# Patient Record
Sex: MALE | Race: WHITE | Employment: FULL TIME | URBAN - NONMETROPOLITAN AREA
[De-identification: names, ages, dates, MRNs, and addresses within clinical notes are randomized per-mention and may not be internally consistent; named-entity substitution may affect disease eponyms.]

---

## 2022-12-02 ENCOUNTER — HOSPITAL ENCOUNTER (EMERGENCY)
Age: 57
Discharge: HOME | End: 2022-12-02
Attending: FAMILY MEDICINE
Payer: COMMERCIAL

## 2022-12-02 VITALS
WEIGHT: 222.7 LBS | TEMPERATURE: 98.7 F | RESPIRATION RATE: 20 BRPM | DIASTOLIC BLOOD PRESSURE: 70 MMHG | HEIGHT: 78 IN | BODY MASS INDEX: 25.77 KG/M2 | SYSTOLIC BLOOD PRESSURE: 122 MMHG | HEART RATE: 82 BPM | OXYGEN SATURATION: 97 %

## 2022-12-02 DIAGNOSIS — N30.01 ACUTE CYSTITIS WITH HEMATURIA: Primary | ICD-10-CM

## 2022-12-02 LAB
-: ABNORMAL
BACTERIA: ABNORMAL
BILIRUBIN URINE: NEGATIVE
COLOR: YELLOW
COMMENT UA: ABNORMAL
EPITHELIAL CELLS UA: ABNORMAL /HPF
GLUCOSE URINE: NEGATIVE
KETONES, URINE: NEGATIVE
LEUKOCYTE ESTERASE, URINE: ABNORMAL
NITRITE, URINE: NEGATIVE
PH UA: 6 (ref 5–8)
PROTEIN UA: ABNORMAL
RBC UA: ABNORMAL /HPF (ref 0–2)
SPECIFIC GRAVITY UA: 1.01 (ref 1–1.03)
TURBIDITY: CLEAR
URINE HGB: ABNORMAL
UROBILINOGEN, URINE: NORMAL
WBC UA: ABNORMAL /HPF

## 2022-12-02 PROCEDURE — 6370000000 HC RX 637 (ALT 250 FOR IP): Performed by: FAMILY MEDICINE

## 2022-12-02 PROCEDURE — 87186 SC STD MICRODIL/AGAR DIL: CPT

## 2022-12-02 PROCEDURE — 87086 URINE CULTURE/COLONY COUNT: CPT

## 2022-12-02 PROCEDURE — 81001 URINALYSIS AUTO W/SCOPE: CPT

## 2022-12-02 PROCEDURE — 87088 URINE BACTERIA CULTURE: CPT

## 2022-12-02 PROCEDURE — 99283 EMERGENCY DEPT VISIT LOW MDM: CPT

## 2022-12-02 RX ORDER — SULFAMETHOXAZOLE AND TRIMETHOPRIM 800; 160 MG/1; MG/1
1 TABLET ORAL 2 TIMES DAILY
Qty: 20 TABLET | Refills: 0 | Status: SHIPPED | OUTPATIENT
Start: 2022-12-02 | End: 2022-12-12

## 2022-12-02 RX ORDER — SULFAMETHOXAZOLE AND TRIMETHOPRIM 800; 160 MG/1; MG/1
1 TABLET ORAL ONCE
Status: COMPLETED | OUTPATIENT
Start: 2022-12-02 | End: 2022-12-02

## 2022-12-02 RX ADMIN — SULFAMETHOXAZOLE AND TRIMETHOPRIM 1 TABLET: 800; 160 TABLET ORAL at 22:56

## 2022-12-02 ASSESSMENT — PAIN DESCRIPTION - PAIN TYPE: TYPE: ACUTE PAIN

## 2022-12-02 ASSESSMENT — PAIN DESCRIPTION - DESCRIPTORS: DESCRIPTORS: BURNING

## 2022-12-02 ASSESSMENT — PAIN - FUNCTIONAL ASSESSMENT: PAIN_FUNCTIONAL_ASSESSMENT: 0-10

## 2022-12-02 ASSESSMENT — PAIN DESCRIPTION - LOCATION: LOCATION: PENIS

## 2022-12-02 ASSESSMENT — PAIN SCALES - GENERAL: PAINLEVEL_OUTOF10: 5

## 2022-12-03 NOTE — ED PROVIDER NOTES
[facility]  eMERGENCY dEPARTMENT eNCOUnter          [facility]       Chief Complaint   Patient presents with    Dysuria     C/o about dysuria for the past 3 days, states \"it hurts when I go pee. \"       Nurses Notes reviewed and I agree except as noted in the HPI. HISTORY OF PRESENT ILLNESS    Lv Cintron is a 61 y.o. male who presents to the emergency room via private vehicle, patient complaining of 3 days of dysuria, describing 5 out of 10 pain with urination, burning, denies any gross pus denies any trauma. Patient states not been sexually active for the past year, denies possibility of STD. Patient states 3 to 5 years ago he did have a problem that he had seen urologist, was put on antibiotics, the hemorrhoid was called. REVIEW OF SYSTEMS     Review of Systems   Genitourinary:  Positive for dysuria. All other systems reviewed and are negative. PAST MEDICAL HISTORY    has a past medical history of Depression, Hyperlipidemia, and Pneumonia. SURGICAL HISTORY      has a past surgical history that includes knee surgery (Left, 2005); Vasectomy (2004); knee surgery (Left); knee surgery (Left); Colonoscopy; Dental surgery; Appendectomy; and other surgical history (Left, 03/03/2015). CURRENT MEDICATIONS       Discharge Medication List as of 12/2/2022 10:53 PM        CONTINUE these medications which have NOT CHANGED    Details   desvenlafaxine (PRISTIQ) 50 MG TB24 Take 50 mg by mouth daily. cyclobenzaprine (FLEXERIL) 10 MG tablet Take 10 mg by mouth 3 times daily as needed. naproxen sodium (ANAPROX) 550 MG tablet Take 1 tab by mouth every 12 hours when necessary pain, Disp-14 tablet, R-0             ALLERGIES     is allergic to celery oil. FAMILY HISTORY     has no family status information on file. family history is not on file. SOCIAL HISTORY      reports that he has been smoking cigarettes. He has a 18.00 pack-year smoking history.  He has never used smokeless tobacco. He reports current alcohol use of about 3.0 standard drinks per week. He reports that he does not use drugs. PHYSICAL EXAM     INITIAL VITALS:  height is 6' 6.5\" (1.994 m) and weight is 222 lb 11.2 oz (101 kg). His oral temperature is 98.7 °F (37.1 °C). His blood pressure is 122/70 and his pulse is 82. His respiration is 20 and oxygen saturation is 97%. Physical Exam   Constitutional: Patient is oriented to person, place, and time. Patient appears well-developed and well-nourished. Patient is active and cooperative. Neck: Full passive range of motion without pain and phonation normal.   Cardiovascular:  Normal rate, regular rhythm and intact distal pulses. Pulses: Right radial pulse  2+   Pulmonary/Chest: Effort normal. No tachypnea and no bradypnea. Abdominal: Soft. Patient without distension or tenderness, no marked flank tenderness  : deferred  Musculoskeletal:   Negative acute trauma or deformity,  apparent full range of motion and normal strength all extremities appropriate to age. Neurological: Patient is alert and oriented to person, place, and time. patient displays no tremor. Patient displays no seizure activity. Skin: Skin is warm and dry. Patient is not diaphoretic. Psychiatric: Patient has a normal mood and affect.  Patient speech is normal and behavior is normal. Cognition and memory are normal.     DIFFERENTIAL DIAGNOSIS:   UTI, prostatitis    DIAGNOSTIC RESULTS           RADIOLOGY: non-plain film images(s) such as CT, Ultrasound and MRI are read by the radiologist.  No orders to display       LABS:   Labs Reviewed   URINALYSIS - Abnormal; Notable for the following components:       Result Value    Urine Hgb 2+ (*)     Protein, UA 1+ (*)     Leukocyte Esterase, Urine 3+ (*)     All other components within normal limits   MICROSCOPIC URINALYSIS - Abnormal; Notable for the following components:    Bacteria, UA RARE (*)     All other components within normal limits   CULTURE, URINE       EMERGENCY DEPARTMENT COURSE:   Vitals:    Vitals:    12/02/22 2156 12/02/22 2158 12/02/22 2202   BP:  135/81 122/70   Pulse:  82    Resp:  20    Temp:  98.7 °F (37.1 °C)    TempSrc:  Oral    SpO2:  97%    Weight: 222 lb 11.2 oz (101 kg)     Height: 6' 6.5\" (1.994 m)       Patient history and physical exam taken at bedside, discussed patient symptoms and exam findings, discussed getting urine sample will reevaluate, patient acknowledges    Urinalysis reviewed    Discussed with patient urine findings, consistent with likely UTI, further discussion with pitting him seeing a urologist for years prior, is possible patient has a history of prostatitis, given his history, as well as noting patient's height of 6 foot 6 elongated fingers, he is never tested for and does not know what Marfan syndrome is, we will start him on Bactrim DS instead of a fluoroquinolone, first dose in the emergency room prescription same, let patient follow-up with his PCP, can also follow-up with his previous urology group although unable to find his urologist by name I did list another urologist in the same group for contact, advised patient stay well-hydrated with water throughout the day, acknowledges    FINAL IMPRESSION      1.  Acute cystitis with hematuria          DISPOSITION/PLAN   D/c    PATIENT REFERRED TO:  WIN Ramírez  [facility]  [place] [id number]    Call       Juanita King [phone] [address]  [phone]      Sharla Hernandez MEDICATIONS:  Discharge Medication List as of 12/2/2022 10:53 PM        START taking these medications    Details   sulfamethoxazole-trimethoprim (BACTRIM DS) 800-160 MG per tablet Take 1 tablet by mouth 2 times daily for 10 days, Disp-20 tablet, R-0Normal                 Summation      Patient Course:  d/c    ED Medications administered this visit:    Medications   sulfamethoxazole-trimethoprim (BACTRIM DS;SEPTRA DS) 800-160 MG per tablet 1 tablet (1 tablet Oral Given 12/2/22 4371)       New Prescriptions from this visit:    Discharge Medication List as of 12/2/2022 10:53 PM        START taking these medications    Details   sulfamethoxazole-trimethoprim (BACTRIM DS) 800-160 MG per tablet Take 1 tablet by mouth 2 times daily for 10 days, Disp-20 tablet, R-0Normal             Follow-up:  WIN Perez  [facility] [id number]    Call       Juanita Almeida [phone] [address]  [phone]      Urololgy        Final Impression:   1.  Acute cystitis with hematuria               (Please note that portions of this note were completed with a voice recognition program.  Efforts were made to edit the dictations but occasionally words are mis-transcribed.)    MD Austin Wolfe MD  12/02/22 9225

## 2022-12-04 LAB
CULTURE: ABNORMAL
SPECIMEN DESCRIPTION: ABNORMAL

## 2023-02-01 ENCOUNTER — HOSPITAL ENCOUNTER (EMERGENCY)
Age: 58
Discharge: HOME | End: 2023-02-02
Attending: FAMILY MEDICINE
Payer: COMMERCIAL

## 2023-02-01 VITALS
HEIGHT: 78 IN | DIASTOLIC BLOOD PRESSURE: 60 MMHG | WEIGHT: 220.8 LBS | TEMPERATURE: 98.7 F | OXYGEN SATURATION: 98 % | RESPIRATION RATE: 16 BRPM | HEART RATE: 66 BPM | SYSTOLIC BLOOD PRESSURE: 103 MMHG | BODY MASS INDEX: 25.55 KG/M2

## 2023-02-01 DIAGNOSIS — S61.411A LACERATION OF RIGHT HAND WITHOUT FOREIGN BODY, INITIAL ENCOUNTER: Primary | ICD-10-CM

## 2023-02-01 DIAGNOSIS — Z78.9 DIPHTHERIA-PERTUSSIS-TETANUS (DPT) VACCINATION ADMINISTERED AT CURRENT VISIT: ICD-10-CM

## 2023-02-01 PROCEDURE — 2500000003 HC RX 250 WO HCPCS: Performed by: FAMILY MEDICINE

## 2023-02-01 PROCEDURE — 6360000002 HC RX W HCPCS: Performed by: FAMILY MEDICINE

## 2023-02-01 PROCEDURE — 99285 EMERGENCY DEPT VISIT HI MDM: CPT

## 2023-02-01 PROCEDURE — 12001 RPR S/N/AX/GEN/TRNK 2.5CM/<: CPT

## 2023-02-01 PROCEDURE — 90715 TDAP VACCINE 7 YRS/> IM: CPT | Performed by: FAMILY MEDICINE

## 2023-02-01 PROCEDURE — 6370000000 HC RX 637 (ALT 250 FOR IP): Performed by: FAMILY MEDICINE

## 2023-02-01 PROCEDURE — 90471 IMMUNIZATION ADMIN: CPT | Performed by: FAMILY MEDICINE

## 2023-02-01 RX ORDER — LIDOCAINE HYDROCHLORIDE 10 MG/ML
5 INJECTION, SOLUTION INFILTRATION; PERINEURAL ONCE
Status: COMPLETED | OUTPATIENT
Start: 2023-02-01 | End: 2023-02-01

## 2023-02-01 RX ORDER — GINSENG 100 MG
CAPSULE ORAL ONCE
Status: COMPLETED | OUTPATIENT
Start: 2023-02-01 | End: 2023-02-01

## 2023-02-01 RX ORDER — CEPHALEXIN 500 MG/1
500 CAPSULE ORAL 3 TIMES DAILY
Qty: 30 CAPSULE | Refills: 0 | Status: SHIPPED | OUTPATIENT
Start: 2023-02-01 | End: 2023-02-11

## 2023-02-01 RX ADMIN — LIDOCAINE HYDROCHLORIDE 5 ML: 10 INJECTION, SOLUTION INFILTRATION; PERINEURAL at 22:52

## 2023-02-01 RX ADMIN — BACITRACIN: 500 OINTMENT TOPICAL at 22:49

## 2023-02-01 RX ADMIN — TETANUS TOXOID, REDUCED DIPHTHERIA TOXOID AND ACELLULAR PERTUSSIS VACCINE, ADSORBED 0.5 ML: 5; 2.5; 8; 8; 2.5 SUSPENSION INTRAMUSCULAR at 22:50

## 2023-02-01 ASSESSMENT — PAIN SCALES - GENERAL
PAINLEVEL_OUTOF10: 5
PAINLEVEL_OUTOF10: 4

## 2023-02-01 ASSESSMENT — PAIN - FUNCTIONAL ASSESSMENT: PAIN_FUNCTIONAL_ASSESSMENT: 0-10

## 2023-02-02 ASSESSMENT — PAIN SCALES - GENERAL: PAINLEVEL_OUTOF10: 2

## 2023-02-02 NOTE — ED PROVIDER NOTES
[address]      Pt Name: Fariba Romo  MRN: [id number]  [place] 1965  Date of evaluation: 2/1/2023  Provider: Kelsie Song MD    CHIEF COMPLAINT       Chief Complaint   Patient presents with    Laceration     Laceration to the right hand states that his tetanus has been over 10 years         HISTORY OF PRESENT ILLNESS      Fariba Romo is a 61 y.o. male who presents to the emergency department to the emergency room via private vehicle, patient states he was at work helping unjam a conveyor belt, when his hand got cut, indicating a 2 cm laceration of the dorsum of his right hand. Patient states initially was a lot of bleeding noticed and stopped. Denies other injury. Patient unsure his last tetanus shot but thinks it may been more than 10 years ago. REVIEW OF SYSTEMS       Review of Systems   Skin:  Positive for wound. All other systems reviewed and are negative. PAST MEDICAL HISTORY     Past Medical History:   Diagnosis Date    Depression     Hyperlipidemia     Pneumonia          SURGICAL HISTORY       Past Surgical History:   Procedure Laterality Date    APPENDECTOMY      COLONOSCOPY      DENTAL SURGERY      KNEE SURGERY Left 2005    Patella Fracture     KNEE SURGERY Left     Hip Grafts to repair patella     KNEE SURGERY Left     Hardware Removal x2 procedure    OTHER SURGICAL HISTORY Left 03/03/2015    hammertoe correction 2nd, 3rd, 4th toes,(absorbable pins in 2nd&3rd) & skin lesion excision left foot. VASECTOMY  2004         CURRENT MEDICATIONS       Previous Medications    CYCLOBENZAPRINE (FLEXERIL) 10 MG TABLET    Take 10 mg by mouth 3 times daily as needed. DESVENLAFAXINE (PRISTIQ) 50 MG TB24    Take 50 mg by mouth daily. NAPROXEN SODIUM (ANAPROX) 550 MG TABLET    Take 1 tab by mouth every 12 hours when necessary pain       ALLERGIES       Celery oil    FAMILY HISTORY       History reviewed.  No pertinent family history.       SOCIAL HISTORY       Social History     Tobacco Use    Smoking status: Every Day     Packs/day: 0.50     Years: 36.00     Pack years: 18.00     Types: Cigarettes    Smokeless tobacco: Never   Substance Use Topics    Alcohol use: Yes     Alcohol/week: 3.0 standard drinks     Types: 3 Cans of beer per week     Comment: occasional     Drug use: No         PHYSICAL EXAM       ED Triage Vitals [02/01/23 2235]   BP Temp Temp src Heart Rate Resp SpO2 Height Weight   115/65 98.7 °F (37.1 °C) -- 66 16 97 % -- --       Physical Exam  Physical Exam   Constitutional: Patient is oriented to person, place, and time. Patient appears well-developed and well-nourished. Patient is active and cooperative.      Neck: Full passive range of motion without pain and phonation normal.   Cardiovascular:  Normal rate, regular rhythm and intact distal pulses.     Pulses: Right radial pulse  2+   Pulmonary/Chest: Effort normal. No tachypnea and no bradypnea.   Abdominal: BMI 25.4, patient without distension   Musculoskeletal:   Focused examination patient's right upper extremity, dorsum of the right hand, shows a roughly 2 cm linear laceration overlying perpendicular to the fourth metacarpal, gaping approximately 2.5 mm, there is no active bleed, patient maintains full range of motion of digit, distal CSM intact all digits    Except as otherwise noted, negative acute trauma or deformity,  apparent full range of motion and normal strength all extremities appropriate to age.  Neurological: Patient is alert and oriented to person, place, and time. patient displays no tremor. Patient displays no seizure activity.   Skin: Skin is warm and dry. Patient is not diaphoretic.  Psychiatric: Patient has a normal mood and affect. Patient speech is normal and behavior is normal. Cognition and memory are normal.     DIAGNOSTIC RESULTS     EKG: N/A    RADIOLOGY:     N/A    Interpretation per the Radiologist below, if available at the time of  this note:    No orders to display         LABS:  Labs Reviewed - No data to display    All other labs were within normal range or not returned as of this dictation. MIPS    Not applicable      EMERGENCY DEPARTMENT COURSE and DIFFERENTIAL DIAGNOSIS/MDM:     Patient history and physical exam taken at bedside, discussed need for laceration repair given wound, will update tetanus shot, given location will start patient antibiotics, patient acknowledges. Tdap updated    See procedure note below    Discussed with patient wound care, watching for signs symptoms of infection, outpatient follow-up with [facility], prescription for cephalexin sent to pharmacy of record, acknowledged    1)  Number and Complexity of Problems  Problem List This Visit: Laceration right hand    Differential Diagnosis: Laceration    Diagnoses Considered but Do Not Suspect: N/A    Pertinent Comorbid Conditions:  N/A    2)  Data Reviewed  My EKG interpretation:  As above    Decision Rules/Scores utilized: N/A    Tests considered but not ordered and why: N/A    External Documents Reviewed: N/A    Imaging that is independently reviewed and interpreted by me as: N/A    See more data below for the lab and radiology tests and orders. 3)  Treatment and Disposition    Patient repeat assessment:  As above    Disposition discussion with patient/family: Discharge home with outpatient follow-up with occupational health    Case discussed with consulting clinician:  As above    Social determinants of health impacting treatment or disposition: N/A    Shared Decision Making: N/A    Code Status Discussion: N/A      REASSESSMENT     As above      CRITICAL CARE TIME     Total Critical Care time was 0 minutes, excluding separately reportable procedures. There was a high probability of clinically significant/life threatening deterioration in the patient's condition which required my urgent intervention.       PROCEDURES:  Unless otherwise noted below, none   Lac Repair    Date/Time: 2/2/2023 2:43 AM  Performed by: Deni Giraldo MD  Authorized by: Deni Giraldo MD     Consent:     Consent obtained:  Verbal    Consent given by:  Patient    Risks, benefits, and alternatives were discussed: yes      Risks discussed:  Infection and pain  Universal protocol:     Procedure explained and questions answered to patient or proxy's satisfaction: yes      Relevant documents present and verified: yes      Test results available: yes      Imaging studies available: yes      Required blood products, implants, devices, and special equipment available: yes      Site/side marked: yes      Immediately prior to procedure, a time out was called: yes      Patient identity confirmed:  Verbally with patient  Anesthesia:     Anesthesia method:  Local infiltration    Local anesthetic:  Lidocaine 1% w/o epi  Laceration details:     Location:  Hand    Hand location:  R hand, dorsum    Length (cm):  2  Pre-procedure details:     Preparation:  Patient was prepped and draped in usual sterile fashion  Exploration:     Hemostasis achieved with:  Direct pressure    Wound exploration: wound explored through full range of motion and entire depth of wound visualized      Wound extent: no foreign bodies/material noted, no nerve damage noted, no tendon damage noted, no underlying fracture noted and no vascular damage noted      Contaminated: no    Treatment:     Area cleansed with:  Povidone-iodine and saline    Amount of cleaning:  Standard    Irrigation solution:  Sterile saline    Irrigation volume:  50  Skin repair:     Repair method:  Sutures    Suture size:  5-0    Suture material:  Prolene    Suture technique:  Simple interrupted    Number of sutures:  4  Approximation:     Approximation:  Close  Repair type:     Repair type:  Simple  Post-procedure details:     Dressing:  Antibiotic ointment and sterile dressing    Procedure completion:  Tolerated    FINAL IMPRESSION      No diagnosis  found.      DISPOSITION/PLAN     DISPOSITION        PATIENT REFERRED TO:  No follow-up provider specified.     DISCHARGE MEDICATIONS:  New Prescriptions    No medications on file       (Please note that portions of this note were completed with a voice recognition program.  Efforts were made to edit the dictations but occasionally words are mis-transcribed.)    Cheri Reardon MD (electronically signed)  Attending Emergency Physician           Cheri Reardon MD  02/02/23 0339

## 2023-02-02 NOTE — ED NOTES
While assisting the physician with laceration repair pt reported feeling dizzy and appeared to have passed out. Pt had communicated with this nurse prior that he cannot see his own blood. Physician at bedside pt was placed in trendelenburg.      Minal Amin RN  02/01/23 3912

## 2023-02-02 NOTE — ED NOTES
Wound cleansed with normal saline and hibiclens. Bleeding is stopped at this time.       Tea Whaley RN  02/01/23 0692

## 2023-02-02 NOTE — ED NOTES
Pt appears to be feeling better pt hob was elevated and pt requested water which was provided .       Robin Fournier RN  02/01/23 0142